# Patient Record
Sex: FEMALE | NOT HISPANIC OR LATINO | Employment: FULL TIME | URBAN - METROPOLITAN AREA
[De-identification: names, ages, dates, MRNs, and addresses within clinical notes are randomized per-mention and may not be internally consistent; named-entity substitution may affect disease eponyms.]

---

## 2024-08-30 ENCOUNTER — OFFICE VISIT (OUTPATIENT)
Dept: PRIMARY CARE | Facility: CLINIC | Age: 29
End: 2024-08-30
Payer: COMMERCIAL

## 2024-08-30 VITALS
BODY MASS INDEX: 31.49 KG/M2 | SYSTOLIC BLOOD PRESSURE: 128 MMHG | TEMPERATURE: 98.6 F | OXYGEN SATURATION: 98 % | WEIGHT: 193.6 LBS | HEART RATE: 70 BPM | DIASTOLIC BLOOD PRESSURE: 70 MMHG

## 2024-08-30 DIAGNOSIS — J01.10 ACUTE NON-RECURRENT FRONTAL SINUSITIS: Primary | ICD-10-CM

## 2024-08-30 PROCEDURE — 99214 OFFICE O/P EST MOD 30 MIN: CPT | Performed by: FAMILY MEDICINE

## 2024-08-30 PROCEDURE — 1036F TOBACCO NON-USER: CPT | Performed by: FAMILY MEDICINE

## 2024-08-30 RX ORDER — AMOXICILLIN 500 MG/1
500 CAPSULE ORAL 2 TIMES DAILY
Qty: 20 CAPSULE | Refills: 0 | Status: SHIPPED | OUTPATIENT
Start: 2024-08-30 | End: 2024-09-09

## 2024-08-30 ASSESSMENT — ENCOUNTER SYMPTOMS
COLOR CHANGE: 0
CHEST TIGHTNESS: 0
COUGH: 0
FATIGUE: 0
DIZZINESS: 0
APPETITE CHANGE: 0
BACK PAIN: 0
CHOKING: 0
ACTIVITY CHANGE: 0
FACIAL ASYMMETRY: 0
PALPITATIONS: 0
HEADACHES: 0
ARTHRALGIAS: 0
FEVER: 0
LIGHT-HEADEDNESS: 0

## 2024-08-30 NOTE — PROGRESS NOTES
Subjective   Patient ID: Jennifer Tidwell is a 29 y.o. female who presents for Possible sinus infection.  (Cough with chest congestion, nasal drainage, facial pressure. X yesterday. Neg for covid this morning. ).    HPI   Patient has been having cough, congestion facial pressure for one day. She is concerned because of a holiday weekend.   She did take a covid test this am and it was negative.     Review of Systems   Constitutional:  Negative for activity change, appetite change, fatigue and fever.   HENT:  Negative for congestion.    Respiratory:  Negative for cough, choking and chest tightness.    Cardiovascular:  Negative for chest pain, palpitations and leg swelling.   Musculoskeletal:  Negative for arthralgias, back pain and gait problem.   Skin:  Negative for color change and pallor.   Neurological:  Negative for dizziness, facial asymmetry, light-headedness and headaches.       Objective   /70 (BP Location: Right arm, Patient Position: Sitting)   Pulse 70   Temp 37 °C (98.6 °F)   Wt 87.8 kg (193 lb 9.6 oz)   SpO2 98%   BMI 31.49 kg/m²   BSA Body surface area is 2.02 meters squared.      Physical Exam  Constitutional:       General: She is not in acute distress.     Appearance: Normal appearance. She is not toxic-appearing.   HENT:      Head: Normocephalic.      Right Ear: Tympanic membrane, ear canal and external ear normal.      Left Ear: Tympanic membrane, ear canal and external ear normal.   Eyes:      Conjunctiva/sclera: Conjunctivae normal.      Pupils: Pupils are equal, round, and reactive to light.   Cardiovascular:      Rate and Rhythm: Normal rate and regular rhythm.      Pulses: Normal pulses.      Heart sounds: Normal heart sounds.   Pulmonary:      Effort: No respiratory distress.      Breath sounds: No wheezing, rhonchi or rales.   Abdominal:      General: Bowel sounds are normal. There is no distension.      Palpations: Abdomen is soft.      Tenderness: There is no abdominal tenderness.    Musculoskeletal:         General: No swelling or tenderness.   Skin:     Findings: No lesion or rash.   Neurological:      General: No focal deficit present.      Mental Status: She is alert and oriented to person, place, and time. Mental status is at baseline.      Gait: Gait normal.   Psychiatric:         Mood and Affect: Mood normal.         Behavior: Behavior normal.         Thought Content: Thought content normal.         Judgment: Judgment normal.       No visits with results within 1 Year(s) from this visit.   Latest known visit with results is:   [facility] Encounter on 10/30/2020   Component Date Value Ref Range Status    WBC 10/30/2020 6.4  4.4 - 11.3 x10E9/L Final    nRBC 10/30/2020 0.0  0.0 - 0.0 /100 WBC Final    RBC 10/30/2020 3.93 (L)  4.00 - 5.20 x10E12/L Final    Hemoglobin 10/30/2020 12.8  12.0 - 16.0 g/dL Final    Hematocrit 10/30/2020 39.7  36.0 - 46.0 % Final    MCV 10/30/2020 101 (H)  80 - 100 fL Final    MCHC 10/30/2020 32.2  32.0 - 36.0 g/dL Final    Platelets 10/30/2020 231  150 - 450 x10E9/L Final    RDW 10/30/2020 12.4  11.5 - 14.5 % Final    Neutrophils % 10/30/2020 73.1  40.0 - 80.0 % Final    Immature Granulocytes %, Automated 10/30/2020 0.3  0.0 - 0.9 % Final    Comment:  Immature Granulocyte Count (IG) includes promyelocytes,    myelocytes and metamyelocytes but does not include bands.   Percent differential counts (%) should be interpreted in the   context of the absolute cell counts (cells/L).      Lymphocytes % 10/30/2020 19.8  13.0 - 44.0 % Final    Monocytes % 10/30/2020 5.5  2.0 - 10.0 % Final    Eosinophils % 10/30/2020 0.8  0.0 - 6.0 % Final    Basophils % 10/30/2020 0.5  0.0 - 2.0 % Final    Neutrophils Absolute 10/30/2020 4.64  1.20 - 7.70 x10E9/L Final    Lymphocytes Absolute 10/30/2020 1.26  1.20 - 4.80 x10E9/L Final    Monocytes Absolute 10/30/2020 0.35  0.10 - 1.00 x10E9/L Final    Eosinophils Absolute 10/30/2020 0.05  0.00 - 0.70 x10E9/L Final    Basophils Absolute  10/30/2020 0.03  0.00 - 0.10 x10E9/L Final    TSH 10/30/2020 1.46  0.44 - 3.98 mIU/L Final    Comment:  TSH testing is performed using different testing    methodology at [facility] than at other    [facility]. Direct result comparisons should    only be made within the same method.      Cholesterol 10/30/2020 206 (H)  0 - 199 mg/dL Final    Comment: .      AGE      DESIRABLE   BORDERLINE HIGH   HIGH     0-19 Y     0 - 169       170 - 199     >/= 200    20-24 Y     0 - 189       190 - 224     >/= 225         >24 Y     0 - 199       200 - 239     >/= 240   **All ranges are based on fasting samples. Specific   therapeutic targets will vary based on patient-specific   cardiac risk.  .   Pediatric guidelines reference:Pediatrics 2011, 128(S5).   Adult guidelines reference: NCEP ATPIII Guidelines,     CHINMAY 2001, 258:7716-97  .   Venipuncture immediately after or during the    administration of Metamizole may lead to falsely   low results. Testing should be performed immediately   prior to Metamizole dosing.      HDL 10/30/2020 55.0  mg/dL Final    Comment: .      AGE      VERY LOW   LOW     NORMAL    HIGH       0-19 Y       < 35   < 40     40-45     ----    20-24 Y       ----   < 40       >45     ----      >24 Y       ----   < 40     40-60      >60  .      Cholesterol/HDL Ratio 10/30/2020 3.7   Final    Comment: REF VALUES  DESIRABLE  < 3.4  HIGH RISK  > 5.0      LDL 10/30/2020 137 (H)  0 - 119 mg/dL Final    Comment: .                           NEAR      BORD      AGE      DESIRABLE  OPTIMAL    HIGH     HIGH     VERY HIGH     0-19 Y     0 - 109     ---    110-129   >/= 130     ----    20-24 Y     0 - 119     ---    120-159   >/= 160     ----      >24 Y     0 -  99   100-129  130-159   160-189     >/=190  .      VLDL 10/30/2020 14  0 - 40 mg/dL Final    Triglycerides 10/30/2020 71  0 - 149 mg/dL Final    Comment: .      AGE      DESIRABLE   BORDERLINE HIGH   HIGH     VERY HIGH   0 D-90 D    19 - 174          ----         ----        ----  91 D- 9 Y     0 -  74        75 -  99     >/= 100      ----    10-19 Y     0 -  89        90 - 129     >/= 130      ----    20-24 Y     0 - 114       115 - 149     >/= 150      ----         >24 Y     0 - 149       150 - 199    200- 499    >/= 500  .   Venipuncture immediately after or during the    administration of Metamizole may lead to falsely   low results. Testing should be performed immediately   prior to Metamizole dosing.      Non HDL Cholesterol 10/30/2020 151 (H)  0 - 149 mg/dL Final    Comment:     AGE      DESIRABLE   BORDERLINE HIGH   HIGH     VERY HIGH     0-19 Y     0 - 119       120 - 144     >/= 145    >/= 160    20-24 Y     0 - 149       150 - 189     >/= 190      ----         >24 Y    30 MG/DL ABOVE LDL CHOLESTEROL GOAL  .      Vitamin D, 25-Hydroxy 10/30/2020 38  ng/mL Final    Comment: .  DEFICIENCY:         < 20   NG/ML  INSUFFICIENCY:      20-29  NG/ML  SUFFICIENCY:         NG/ML    THIS ASSAY ACCURATELY QUANTIFIES THE SUM OF  VITAMIN D3, 25-HYDROXY AND VIT D2,25-HYDROXY.      Glucose 10/30/2020 77  74 - 99 mg/dL Final    Sodium 10/30/2020 141  136 - 145 mmol/L Final    Potassium 10/30/2020 4.4  3.5 - 5.3 mmol/L Final    Chloride 10/30/2020 106  98 - 107 mmol/L Final    Bicarbonate 10/30/2020 26  21 - 32 mmol/L Final    Anion Gap 10/30/2020 13  10 - 20 mmol/L Final    Urea Nitrogen 10/30/2020 18  6 - 23 mg/dL Final    Creatinine 10/30/2020 1.07 (H)  0.50 - 1.05 mg/dL Final    GLOMERULAR FILTRATION RATE-NON AFR* 10/30/2020 >60  >60 mL/min/1.73m2 Final    GLOMERULAR FILTRATION RATE-* 10/30/2020 >60  >60 mL/min/1.73m2 Final    Comment:  CALCULATIONS OF ESTIMATED GFR ARE PERFORMED   USING THE MDRD STUDY EQUATION FOR THE   IDMS-TRACEABLE CREATININE METHODS.   CLIN CHEM 2007;53:766-72      Calcium 10/30/2020 9.7  8.6 - 10.6 mg/dL Final    Albumin 10/30/2020 4.6  3.4 - 5.0 g/dL Final    Alkaline Phosphatase 10/30/2020 34  33 - 110 U/L Final    Total  Protein 10/30/2020 7.1  6.4 - 8.2 g/dL Final    AST 10/30/2020 41 (H)  9 - 39 U/L Final    Total Bilirubin 10/30/2020 0.5  0.0 - 1.2 mg/dL Final    ALT (SGPT) 10/30/2020 21  7 - 45 U/L Final    Comment:  Patients treated with Sulfasalazine may generate    falsely decreased results for ALT.       Current Outpatient Medications on File Prior to Visit   Medication Sig Dispense Refill    prenatal vit 93/iron fum/folic (PRENATAL FORMULA ORAL) Take 1 tablet by mouth once daily.       No current facility-administered medications on file prior to visit.     No images are attached to the encounter.        Assessment/Plan   Diagnoses and all orders for this visit:  Acute non-recurrent frontal sinusitis  -     amoxicillin (Amoxil) 500 mg capsule; Take 1 capsule (500 mg) by mouth 2 times a day for 10 days.    Patient to start on antibiotics  Patient to call if questions or concerns

## 2024-11-12 ENCOUNTER — OFFICE VISIT (OUTPATIENT)
Dept: PRIMARY CARE | Facility: CLINIC | Age: 29
End: 2024-11-12
Payer: COMMERCIAL

## 2024-11-12 VITALS
HEART RATE: 69 BPM | TEMPERATURE: 98.2 F | WEIGHT: 208.6 LBS | DIASTOLIC BLOOD PRESSURE: 80 MMHG | BODY MASS INDEX: 33.93 KG/M2 | SYSTOLIC BLOOD PRESSURE: 122 MMHG | OXYGEN SATURATION: 98 %

## 2024-11-12 DIAGNOSIS — J01.10 ACUTE NON-RECURRENT FRONTAL SINUSITIS: Primary | ICD-10-CM

## 2024-11-12 PROCEDURE — 99214 OFFICE O/P EST MOD 30 MIN: CPT | Performed by: FAMILY MEDICINE

## 2024-11-12 PROCEDURE — 1036F TOBACCO NON-USER: CPT | Performed by: FAMILY MEDICINE

## 2024-11-12 RX ORDER — FOLIC ACID 0.4 MG
0.4 TABLET ORAL NIGHTLY
COMMUNITY

## 2024-11-12 RX ORDER — BUTALB/ACETAMINOPHEN/CAFFEINE 50-325-40
50 TABLET ORAL DAILY
COMMUNITY

## 2024-11-12 RX ORDER — AMOXICILLIN 875 MG/1
875 TABLET, FILM COATED ORAL 2 TIMES DAILY
Qty: 20 TABLET | Refills: 0 | Status: SHIPPED | OUTPATIENT
Start: 2024-11-12 | End: 2024-11-22

## 2024-11-12 ASSESSMENT — ENCOUNTER SYMPTOMS
FACIAL ASYMMETRY: 0
FEVER: 0
CHOKING: 0
ACTIVITY CHANGE: 0
CHEST TIGHTNESS: 0
COUGH: 0
FATIGUE: 0
ARTHRALGIAS: 0
DIZZINESS: 0
BACK PAIN: 0
COLOR CHANGE: 0
APPETITE CHANGE: 0
PALPITATIONS: 0
LIGHT-HEADEDNESS: 0
HEADACHES: 0

## 2024-11-12 NOTE — PROGRESS NOTES
Subjective   Patient ID: Jennifer Tidwell is a 29 y.o. female who presents for Cough (Facial pressure, nasal drainage, ears feel wet, achy. X 3 days. Neg for covid this am.).    HPI   Patient comes in stating that she has cough facial pressure nasal drainage ears feel wet and achy for 3 days.  Patient is negative for COVID this morning.    Review of Systems   Constitutional:  Negative for activity change, appetite change, fatigue and fever.   HENT:  Negative for congestion.    Respiratory:  Negative for cough, choking and chest tightness.    Cardiovascular:  Negative for chest pain, palpitations and leg swelling.   Musculoskeletal:  Negative for arthralgias, back pain and gait problem.   Skin:  Negative for color change and pallor.   Neurological:  Negative for dizziness, facial asymmetry, light-headedness and headaches.       Objective   /80 (BP Location: Right arm, Patient Position: Sitting)   Pulse 69   Temp 36.8 °C (98.2 °F)   Wt 94.6 kg (208 lb 9.6 oz)   SpO2 98%   BMI 33.93 kg/m²   BSA Body surface area is 2.09 meters squared.      Physical Exam  Constitutional:       General: She is not in acute distress.     Appearance: Normal appearance. She is not toxic-appearing.   HENT:      Head: Normocephalic.      Right Ear: Tympanic membrane, ear canal and external ear normal.      Left Ear: Tympanic membrane, ear canal and external ear normal.   Eyes:      Conjunctiva/sclera: Conjunctivae normal.      Pupils: Pupils are equal, round, and reactive to light.   Cardiovascular:      Rate and Rhythm: Normal rate and regular rhythm.      Pulses: Normal pulses.      Heart sounds: Normal heart sounds.   Pulmonary:      Effort: No respiratory distress.      Breath sounds: No wheezing, rhonchi or rales.   Abdominal:      General: Bowel sounds are normal. There is no distension.      Palpations: Abdomen is soft.      Tenderness: There is no abdominal tenderness.   Musculoskeletal:         General: No swelling or  tenderness.   Skin:     Findings: No lesion or rash.   Neurological:      General: No focal deficit present.      Mental Status: She is alert and oriented to person, place, and time. Mental status is at baseline.      Gait: Gait normal.   Psychiatric:         Mood and Affect: Mood normal.         Behavior: Behavior normal.         Thought Content: Thought content normal.         Judgment: Judgment normal.       No visits with results within 1 Year(s) from this visit.   Latest known visit with results is:   [facility]acy Encounter on 10/30/2020   Component Date Value Ref Range Status    WBC 10/30/2020 6.4  4.4 - 11.3 x10E9/L Final    nRBC 10/30/2020 0.0  0.0 - 0.0 /100 WBC Final    RBC 10/30/2020 3.93 (L)  4.00 - 5.20 x10E12/L Final    Hemoglobin 10/30/2020 12.8  12.0 - 16.0 g/dL Final    Hematocrit 10/30/2020 39.7  36.0 - 46.0 % Final    MCV 10/30/2020 101 (H)  80 - 100 fL Final    MCHC 10/30/2020 32.2  32.0 - 36.0 g/dL Final    Platelets 10/30/2020 231  150 - 450 x10E9/L Final    RDW 10/30/2020 12.4  11.5 - 14.5 % Final    Neutrophils % 10/30/2020 73.1  40.0 - 80.0 % Final    Immature Granulocytes %, Automated 10/30/2020 0.3  0.0 - 0.9 % Final    Comment:  Immature Granulocyte Count (IG) includes promyelocytes,    myelocytes and metamyelocytes but does not include bands.   Percent differential counts (%) should be interpreted in the   context of the absolute cell counts (cells/L).      Lymphocytes % 10/30/2020 19.8  13.0 - 44.0 % Final    Monocytes % 10/30/2020 5.5  2.0 - 10.0 % Final    Eosinophils % 10/30/2020 0.8  0.0 - 6.0 % Final    Basophils % 10/30/2020 0.5  0.0 - 2.0 % Final    Neutrophils Absolute 10/30/2020 4.64  1.20 - 7.70 x10E9/L Final    Lymphocytes Absolute 10/30/2020 1.26  1.20 - 4.80 x10E9/L Final    Monocytes Absolute 10/30/2020 0.35  0.10 - 1.00 x10E9/L Final    Eosinophils Absolute 10/30/2020 0.05  0.00 - 0.70 x10E9/L Final    Basophils Absolute 10/30/2020 0.03  0.00 - 0.10 x10E9/L Final    TSH  10/30/2020 1.46  0.44 - 3.98 mIU/L Final    Comment:  TSH testing is performed using different testing    methodology at [facility] than at other    [facility]. Direct result comparisons should    only be made within the same method.      Cholesterol 10/30/2020 206 (H)  0 - 199 mg/dL Final    Comment: .      AGE      DESIRABLE   BORDERLINE HIGH   HIGH     0-19 Y     0 - 169       170 - 199     >/= 200    20-24 Y     0 - 189       190 - 224     >/= 225         >24 Y     0 - 199       200 - 239     >/= 240   **All ranges are based on fasting samples. Specific   therapeutic targets will vary based on patient-specific   cardiac risk.  .   Pediatric guidelines reference:Pediatrics 2011, 128(S5).   Adult guidelines reference: NCEP ATPIII Guidelines,     CHINMAY 2001, 258:2486-97  .   Venipuncture immediately after or during the    administration of Metamizole may lead to falsely   low results. Testing should be performed immediately   prior to Metamizole dosing.      HDL 10/30/2020 55.0  mg/dL Final    Comment: .      AGE      VERY LOW   LOW     NORMAL    HIGH       0-19 Y       < 35   < 40     40-45     ----    20-24 Y       ----   < 40       >45     ----      >24 Y       ----   < 40     40-60      >60  .      Cholesterol/HDL Ratio 10/30/2020 3.7   Final    Comment: REF VALUES  DESIRABLE  < 3.4  HIGH RISK  > 5.0      LDL 10/30/2020 137 (H)  0 - 119 mg/dL Final    Comment: .                           NEAR      BORD      AGE      DESIRABLE  OPTIMAL    HIGH     HIGH     VERY HIGH     0-19 Y     0 - 109     ---    110-129   >/= 130     ----    20-24 Y     0 - 119     ---    120-159   >/= 160     ----      >24 Y     0 -  99   100-129  130-159   160-189     >/=190  .      VLDL 10/30/2020 14  0 - 40 mg/dL Final    Triglycerides 10/30/2020 71  0 - 149 mg/dL Final    Comment: .      AGE      DESIRABLE   BORDERLINE HIGH   HIGH     VERY HIGH   0 D-90 D    19 - 174         ----         ----        ----  91 D- 9 Y      0 -  74        75 -  99     >/= 100      ----    10-19 Y     0 -  89        90 - 129     >/= 130      ----    20-24 Y     0 - 114       115 - 149     >/= 150      ----         >24 Y     0 - 149       150 - 199    200- 499    >/= 500  .   Venipuncture immediately after or during the    administration of Metamizole may lead to falsely   low results. Testing should be performed immediately   prior to Metamizole dosing.      Non HDL Cholesterol 10/30/2020 151 (H)  0 - 149 mg/dL Final    Comment:     AGE      DESIRABLE   BORDERLINE HIGH   HIGH     VERY HIGH     0-19 Y     0 - 119       120 - 144     >/= 145    >/= 160    20-24 Y     0 - 149       150 - 189     >/= 190      ----         >24 Y    30 MG/DL ABOVE LDL CHOLESTEROL GOAL  .      Vitamin D, 25-Hydroxy 10/30/2020 38  ng/mL Final    Comment: .  DEFICIENCY:         < 20   NG/ML  INSUFFICIENCY:      20-29  NG/ML  SUFFICIENCY:         NG/ML    THIS ASSAY ACCURATELY QUANTIFIES THE SUM OF  VITAMIN D3, 25-HYDROXY AND VIT D2,25-HYDROXY.      Glucose 10/30/2020 77  74 - 99 mg/dL Final    Sodium 10/30/2020 141  136 - 145 mmol/L Final    Potassium 10/30/2020 4.4  3.5 - 5.3 mmol/L Final    Chloride 10/30/2020 106  98 - 107 mmol/L Final    Bicarbonate 10/30/2020 26  21 - 32 mmol/L Final    Anion Gap 10/30/2020 13  10 - 20 mmol/L Final    Urea Nitrogen 10/30/2020 18  6 - 23 mg/dL Final    Creatinine 10/30/2020 1.07 (H)  0.50 - 1.05 mg/dL Final    GLOMERULAR FILTRATION RATE-NON AFR* 10/30/2020 >60  >60 mL/min/1.73m2 Final    GLOMERULAR FILTRATION RATE-* 10/30/2020 >60  >60 mL/min/1.73m2 Final    Comment:  CALCULATIONS OF ESTIMATED GFR ARE PERFORMED   USING THE MDRD STUDY EQUATION FOR THE   IDMS-TRACEABLE CREATININE METHODS.   CLIN CHEM 2007;53:766-72      Calcium 10/30/2020 9.7  8.6 - 10.6 mg/dL Final    Albumin 10/30/2020 4.6  3.4 - 5.0 g/dL Final    Alkaline Phosphatase 10/30/2020 34  33 - 110 U/L Final    Total Protein 10/30/2020 7.1  6.4 - 8.2 g/dL Final    AST  10/30/2020 41 (H)  9 - 39 U/L Final    Total Bilirubin 10/30/2020 0.5  0.0 - 1.2 mg/dL Final    ALT (SGPT) 10/30/2020 21  7 - 45 U/L Final    Comment:  Patients treated with Sulfasalazine may generate    falsely decreased results for ALT.       Current Outpatient Medications on File Prior to Visit   Medication Sig Dispense Refill    calcium citrate-vitamin D3 (Citracal+D) 315 mg-5 mcg (200 unit) tablet Take 50 mcg by mouth once daily.      docosahexaenoic acid 200 mg capsule Take 200 mg by mouth once daily at bedtime.      folic acid (Folvite) 400 mcg tablet Take 1 tablet (0.4 mg) by mouth once daily at bedtime.      prenatal vit 93/iron fum/folic (PRENATAL FORMULA ORAL) Take 1 tablet by mouth once daily.       No current facility-administered medications on file prior to visit.     No images are attached to the encounter.            Assessment/Plan   Diagnoses and all orders for this visit:  Acute non-recurrent frontal sinusitis    Patient to start on antibiotics  Patient to call if questions or concerns

## 2025-01-07 ENCOUNTER — TELEMEDICINE (OUTPATIENT)
Dept: PRIMARY CARE | Facility: CLINIC | Age: 30
End: 2025-01-07
Payer: COMMERCIAL

## 2025-01-07 ENCOUNTER — APPOINTMENT (OUTPATIENT)
Dept: PRIMARY CARE | Facility: CLINIC | Age: 30
End: 2025-01-07
Payer: COMMERCIAL

## 2025-01-07 ENCOUNTER — LAB (OUTPATIENT)
Dept: LAB | Facility: LAB | Age: 30
End: 2025-01-07
Payer: COMMERCIAL

## 2025-01-07 DIAGNOSIS — R35.0 URINARY FREQUENCY: ICD-10-CM

## 2025-01-07 DIAGNOSIS — R35.0 URINARY FREQUENCY: Primary | ICD-10-CM

## 2025-01-07 LAB
APPEARANCE UR: ABNORMAL
BILIRUB UR STRIP.AUTO-MCNC: NEGATIVE MG/DL
COLOR UR: ABNORMAL
GLUCOSE UR STRIP.AUTO-MCNC: NORMAL MG/DL
HOLD SPECIMEN: NORMAL
KETONES UR STRIP.AUTO-MCNC: NEGATIVE MG/DL
LEUKOCYTE ESTERASE UR QL STRIP.AUTO: ABNORMAL
MUCOUS THREADS #/AREA URNS AUTO: ABNORMAL /LPF
NITRITE UR QL STRIP.AUTO: ABNORMAL
PH UR STRIP.AUTO: 6 [PH]
PROT UR STRIP.AUTO-MCNC: ABNORMAL MG/DL
RBC # UR STRIP.AUTO: ABNORMAL /UL
RBC #/AREA URNS AUTO: ABNORMAL /HPF
SP GR UR STRIP.AUTO: 1.03
SQUAMOUS #/AREA URNS AUTO: ABNORMAL /HPF
UROBILINOGEN UR STRIP.AUTO-MCNC: NORMAL MG/DL
WBC #/AREA URNS AUTO: >50 /HPF

## 2025-01-07 PROCEDURE — 87086 URINE CULTURE/COLONY COUNT: CPT

## 2025-01-07 PROCEDURE — 87186 SC STD MICRODIL/AGAR DIL: CPT

## 2025-01-07 PROCEDURE — 81001 URINALYSIS AUTO W/SCOPE: CPT

## 2025-01-07 PROCEDURE — 99214 OFFICE O/P EST MOD 30 MIN: CPT | Performed by: NURSE PRACTITIONER

## 2025-01-07 ASSESSMENT — ENCOUNTER SYMPTOMS
FREQUENCY: 1
HEMATURIA: 0
FLANK PAIN: 1

## 2025-01-07 NOTE — PROGRESS NOTES
Subjective   Patient ID: Jennifer Tidwell is a 29 y.o. female who presents for a Virtual Visit Urinary Problem.    Urinary Frequency   This is a new problem. The current episode started in the past 7 days. The problem occurs every urination (worse at night). The problem has been unchanged. The quality of the pain is described as burning. There has been no fever. She is Not sexually active. There is No history of pyelonephritis. Associated symptoms include flank pain, frequency and urgency. Pertinent negatives include no hematuria or hesitancy. Associated symptoms comments: Endorses left sided flank pain, cloudy urine, dysuria, frequency that is worse at night.  Recently had a baby girl, Dec 11, nursing. She has tried increased fluids for the symptoms. The treatment provided no relief.       Review of Systems   Genitourinary:  Positive for flank pain, frequency and urgency. Negative for hematuria and hesitancy.       Physical Exam not performed  E-visit questionnaire reviewed and discussed with patient.   All questions answered    Assessment/Plan   Problem List Items Addressed This Visit    None  Visit Diagnoses         Codes    Urinary frequency    -  Primary R35.0    Relevant Orders    Urinalysis with Reflex Culture and Microscopic          Discussed with patient   Verbalized understanding, Teach back utilized  Recommend follow up with PCP in   week

## 2025-01-08 DIAGNOSIS — N30.00 ACUTE CYSTITIS WITHOUT HEMATURIA: Primary | ICD-10-CM

## 2025-01-08 RX ORDER — NITROFURANTOIN 25; 75 MG/1; MG/1
100 CAPSULE ORAL 2 TIMES DAILY
Qty: 10 CAPSULE | Refills: 0 | Status: SHIPPED | OUTPATIENT
Start: 2025-01-08 | End: 2025-01-13

## 2025-01-10 LAB — BACTERIA UR CULT: ABNORMAL

## 2025-02-03 ENCOUNTER — APPOINTMENT (OUTPATIENT)
Dept: PRIMARY CARE | Facility: CLINIC | Age: 30
End: 2025-02-03
Payer: COMMERCIAL

## 2025-02-03 VITALS
WEIGHT: 190 LBS | BODY MASS INDEX: 30.9 KG/M2 | SYSTOLIC BLOOD PRESSURE: 128 MMHG | HEART RATE: 54 BPM | DIASTOLIC BLOOD PRESSURE: 82 MMHG

## 2025-02-03 DIAGNOSIS — F41.8 POSTPARTUM ANXIETY: Primary | ICD-10-CM

## 2025-02-03 DIAGNOSIS — J45.990 EXERCISE-INDUCED ASTHMA (HHS-HCC): ICD-10-CM

## 2025-02-03 PROCEDURE — 1036F TOBACCO NON-USER: CPT | Performed by: FAMILY MEDICINE

## 2025-02-03 PROCEDURE — 99214 OFFICE O/P EST MOD 30 MIN: CPT | Performed by: FAMILY MEDICINE

## 2025-02-03 RX ORDER — SERTRALINE HYDROCHLORIDE 25 MG/1
25 TABLET, FILM COATED ORAL DAILY
Qty: 30 TABLET | Refills: 11 | Status: SHIPPED | OUTPATIENT
Start: 2025-02-03 | End: 2026-02-03

## 2025-02-03 ASSESSMENT — ENCOUNTER SYMPTOMS
CONFUSION: 0
FACIAL ASYMMETRY: 0
ACTIVITY CHANGE: 0
COLOR CHANGE: 0
CHEST TIGHTNESS: 0
LIGHT-HEADEDNESS: 0
BACK PAIN: 0
PALPITATIONS: 0
NERVOUS/ANXIOUS: 1
CHOKING: 0
APPETITE CHANGE: 0
AGITATION: 0
DIZZINESS: 0
SLEEP DISTURBANCE: 0
HEADACHES: 0
FATIGUE: 0
COUGH: 0
ARTHRALGIAS: 0
FEVER: 0

## 2025-02-03 ASSESSMENT — ANXIETY QUESTIONNAIRES
7. FEELING AFRAID AS IF SOMETHING AWFUL MIGHT HAPPEN: SEVERAL DAYS
6. BECOMING EASILY ANNOYED OR IRRITABLE: SEVERAL DAYS
1. FEELING NERVOUS, ANXIOUS, OR ON EDGE: SEVERAL DAYS
IF YOU CHECKED OFF ANY PROBLEMS ON THIS QUESTIONNAIRE, HOW DIFFICULT HAVE THESE PROBLEMS MADE IT FOR YOU TO DO YOUR WORK, TAKE CARE OF THINGS AT HOME, OR GET ALONG WITH OTHER PEOPLE: NOT DIFFICULT AT ALL
4. TROUBLE RELAXING: SEVERAL DAYS
GAD7 TOTAL SCORE: 6
2. NOT BEING ABLE TO STOP OR CONTROL WORRYING: SEVERAL DAYS
5. BEING SO RESTLESS THAT IT IS HARD TO SIT STILL: NOT AT ALL
3. WORRYING TOO MUCH ABOUT DIFFERENT THINGS: SEVERAL DAYS

## 2025-02-03 NOTE — PROGRESS NOTES
Subjective   Patient ID: Jennifer Tidwell is a 29 y.o. female who presents for To discuss anxiety medication .    HPI   Patient comes in wishes to discuss her anxiety she is currently postpartum. She has had quite a bit of anxiety especially if she is going out in public. She has had some loneliness.     Review of Systems   Constitutional:  Negative for activity change, appetite change, fatigue and fever.   HENT:  Negative for congestion.    Respiratory:  Negative for cough, choking and chest tightness.    Cardiovascular:  Negative for chest pain, palpitations and leg swelling.   Musculoskeletal:  Negative for arthralgias, back pain and gait problem.   Skin:  Negative for color change and pallor.   Neurological:  Negative for dizziness, facial asymmetry, light-headedness and headaches.   Psychiatric/Behavioral:  Negative for agitation, confusion, self-injury, sleep disturbance and suicidal ideas. The patient is nervous/anxious.        Objective   /82 (BP Location: Left arm, Patient Position: Sitting)   Pulse 54   Wt 86.2 kg (190 lb)   BMI 30.90 kg/m²   BSA Body surface area is 2 meters squared.      Physical Exam  Constitutional:       General: She is not in acute distress.     Appearance: Normal appearance. She is not toxic-appearing.   HENT:      Head: Normocephalic.      Right Ear: Tympanic membrane, ear canal and external ear normal.      Left Ear: Tympanic membrane, ear canal and external ear normal.      Mouth/Throat:      Pharynx: Oropharynx is clear.   Eyes:      Conjunctiva/sclera: Conjunctivae normal.      Pupils: Pupils are equal, round, and reactive to light.   Cardiovascular:      Rate and Rhythm: Normal rate and regular rhythm.      Pulses: Normal pulses.      Heart sounds: Normal heart sounds.   Pulmonary:      Effort: No respiratory distress.      Breath sounds: No wheezing, rhonchi or rales.   Musculoskeletal:         General: No swelling or tenderness.   Skin:     Findings: No lesion or  rash.   Neurological:      General: No focal deficit present.      Mental Status: She is alert and oriented to person, place, and time. Mental status is at baseline.      Gait: Gait normal.   Psychiatric:         Mood and Affect: Mood normal.         Behavior: Behavior normal.         Thought Content: Thought content normal.         Judgment: Judgment normal.       Lab on 01/07/2025   Component Date Value Ref Range Status    Color, Urine 01/07/2025 Light-Yellow  Light-Yellow, Yellow, Dark-Yellow Final    Appearance, Urine 01/07/2025 Turbid (N)  Clear Final    Specific Gravity, Urine 01/07/2025 1.028  1.005 - 1.035 Final    pH, Urine 01/07/2025 6.0  5.0, 5.5, 6.0, 6.5, 7.0, 7.5, 8.0 Final    Protein, Urine 01/07/2025 30 (1+) (A)  NEGATIVE, 10 (TRACE), 20 (TRACE) mg/dL Final    Glucose, Urine 01/07/2025 Normal  Normal mg/dL Final    Blood, Urine 01/07/2025 0.2 (2+) (A)  NEGATIVE Final    Ketones, Urine 01/07/2025 NEGATIVE  NEGATIVE mg/dL Final    Bilirubin, Urine 01/07/2025 NEGATIVE  NEGATIVE Final    Urobilinogen, Urine 01/07/2025 Normal  Normal mg/dL Final    Nitrite, Urine 01/07/2025 2+ (A)  NEGATIVE Final    Leukocyte Esterase, Urine 01/07/2025 250 Zackary/uL (A)  NEGATIVE Final    Extra Tube 01/07/2025 Hold for add-ons.   Final    Auto resulted.    WBC, Urine 01/07/2025 >50 (A)  1-5, NONE /HPF Final    RBC, Urine 01/07/2025 11-20 (A)  NONE, 1-2, 3-5 /HPF Final    Squamous Epithelial Cells, Urine 01/07/2025 1-9 (SPARSE)  Reference range not established. /HPF Final    Mucus, Urine 01/07/2025 FEW  Reference range not established. /LPF Final    Urine Culture 01/07/2025 >=100,000 CFU/mL Escherichia coli (A)   Final     Current Outpatient Medications on File Prior to Visit   Medication Sig Dispense Refill    calcium citrate-vitamin D3 (Citracal+D) 315 mg-5 mcg (200 unit) tablet Take 50 mcg by mouth once daily.      [DISCONTINUED] docosahexaenoic acid 200 mg capsule Take 200 mg by mouth once daily at bedtime. (Patient not  taking: Reported on 2/3/2025)      [DISCONTINUED] folic acid (Folvite) 400 mcg tablet Take 1 tablet (0.4 mg) by mouth once daily at bedtime. (Patient not taking: Reported on 2/3/2025)      [DISCONTINUED] prenatal vit 93/iron fum/folic (PRENATAL FORMULA ORAL) Take 1 tablet by mouth once daily. (Patient not taking: Reported on 2/3/2025)       No current facility-administered medications on file prior to visit.     No images are attached to the encounter.            Assessment/Plan   Diagnoses and all orders for this visit:  Postpartum anxiety  -     sertraline (Zoloft) 25 mg tablet; Take 1 tablet (25 mg) by mouth once daily.  -     Follow Up In [facility]; Future  Exercise-induced asthma ([facility])    1.  Discussion with patient about anxiety and potentially talking to someone  2.  Patient would benefit from starting on Zoloft  3.  Patient to call for questions or concerns

## 2025-03-10 ENCOUNTER — APPOINTMENT (OUTPATIENT)
Dept: PRIMARY CARE | Facility: CLINIC | Age: 30
End: 2025-03-10
Payer: COMMERCIAL

## 2025-03-10 VITALS
BODY MASS INDEX: 30.86 KG/M2 | SYSTOLIC BLOOD PRESSURE: 126 MMHG | HEIGHT: 66 IN | HEART RATE: 65 BPM | OXYGEN SATURATION: 96 % | WEIGHT: 192 LBS | DIASTOLIC BLOOD PRESSURE: 78 MMHG | RESPIRATION RATE: 18 BRPM

## 2025-03-10 DIAGNOSIS — F41.8 POSTPARTUM ANXIETY: Primary | ICD-10-CM

## 2025-03-10 PROCEDURE — 99214 OFFICE O/P EST MOD 30 MIN: CPT | Performed by: FAMILY MEDICINE

## 2025-03-10 PROCEDURE — 1036F TOBACCO NON-USER: CPT | Performed by: FAMILY MEDICINE

## 2025-03-10 PROCEDURE — 3008F BODY MASS INDEX DOCD: CPT | Performed by: FAMILY MEDICINE

## 2025-03-10 ASSESSMENT — ENCOUNTER SYMPTOMS
NERVOUS/ANXIOUS: 1
SLEEP DISTURBANCE: 0
ACTIVITY CHANGE: 0
COUGH: 0
AGITATION: 0
CHOKING: 0
HEADACHES: 0
LIGHT-HEADEDNESS: 0
APPETITE CHANGE: 0
COLOR CHANGE: 0
PALPITATIONS: 0
FEVER: 0
BACK PAIN: 0
ARTHRALGIAS: 0
CHEST TIGHTNESS: 0
DIZZINESS: 0
FACIAL ASYMMETRY: 0
FATIGUE: 0
CONFUSION: 0

## 2025-03-10 ASSESSMENT — PATIENT HEALTH QUESTIONNAIRE - PHQ9
SUM OF ALL RESPONSES TO PHQ9 QUESTIONS 1 AND 2: 0
2. FEELING DOWN, DEPRESSED OR HOPELESS: NOT AT ALL
1. LITTLE INTEREST OR PLEASURE IN DOING THINGS: NOT AT ALL

## 2025-03-10 ASSESSMENT — COLUMBIA-SUICIDE SEVERITY RATING SCALE - C-SSRS
6. HAVE YOU EVER DONE ANYTHING, STARTED TO DO ANYTHING, OR PREPARED TO DO ANYTHING TO END YOUR LIFE?: NO
2. HAVE YOU ACTUALLY HAD ANY THOUGHTS OF KILLING YOURSELF?: NO
1. IN THE PAST MONTH, HAVE YOU WISHED YOU WERE DEAD OR WISHED YOU COULD GO TO SLEEP AND NOT WAKE UP?: NO

## 2025-03-10 ASSESSMENT — ANXIETY QUESTIONNAIRES
7. FEELING AFRAID AS IF SOMETHING AWFUL MIGHT HAPPEN: NOT AT ALL
5. BEING SO RESTLESS THAT IT IS HARD TO SIT STILL: NOT AT ALL
IF YOU CHECKED OFF ANY PROBLEMS ON THIS QUESTIONNAIRE, HOW DIFFICULT HAVE THESE PROBLEMS MADE IT FOR YOU TO DO YOUR WORK, TAKE CARE OF THINGS AT HOME, OR GET ALONG WITH OTHER PEOPLE: NOT DIFFICULT AT ALL
1. FEELING NERVOUS, ANXIOUS, OR ON EDGE: SEVERAL DAYS
4. TROUBLE RELAXING: NOT AT ALL
GAD7 TOTAL SCORE: 1
6. BECOMING EASILY ANNOYED OR IRRITABLE: NOT AT ALL
2. NOT BEING ABLE TO STOP OR CONTROL WORRYING: NOT AT ALL
3. WORRYING TOO MUCH ABOUT DIFFERENT THINGS: NOT AT ALL

## 2025-03-10 NOTE — PROGRESS NOTES
"Subjective   Patient ID: Jennifer Tidwell is a 29 y.o. female who presents for Follow-up (Started on zoloft last visit; patient states that she has had a positive response.).    HPI   Patient comes in wishes to discuss her anxiety she is currently postpartum. She has had quite a bit of anxiety especially if she is going out in public. She has had some loneliness.   She has been much better since starting on the zoloft. She does not feel any side effects of medication.     Review of Systems   Constitutional:  Negative for activity change, appetite change, fatigue and fever.   HENT:  Negative for congestion.    Respiratory:  Negative for cough, choking and chest tightness.    Cardiovascular:  Negative for chest pain, palpitations and leg swelling.   Musculoskeletal:  Negative for arthralgias, back pain and gait problem.   Skin:  Negative for color change and pallor.   Neurological:  Negative for dizziness, facial asymmetry, light-headedness and headaches.   Psychiatric/Behavioral:  Negative for agitation, confusion, self-injury, sleep disturbance and suicidal ideas. The patient is nervous/anxious.        Objective   /78 (BP Location: Left arm, Patient Position: Sitting, BP Cuff Size: Adult)   Pulse 65   Resp 18   Ht 1.67 m (5' 5.75\")   Wt 87.1 kg (192 lb)   SpO2 96%   BMI 31.23 kg/m²   BSA Body surface area is 2.01 meters squared.      Physical Exam  Constitutional:       General: She is not in acute distress.     Appearance: Normal appearance. She is not toxic-appearing.   HENT:      Head: Normocephalic.      Right Ear: Tympanic membrane, ear canal and external ear normal.      Left Ear: Tympanic membrane, ear canal and external ear normal.      Mouth/Throat:      Pharynx: Oropharynx is clear.   Eyes:      Conjunctiva/sclera: Conjunctivae normal.      Pupils: Pupils are equal, round, and reactive to light.   Cardiovascular:      Rate and Rhythm: Normal rate and regular rhythm.      Pulses: Normal pulses.    "   Heart sounds: Normal heart sounds.   Pulmonary:      Effort: No respiratory distress.      Breath sounds: No wheezing, rhonchi or rales.   Musculoskeletal:         General: No swelling or tenderness.   Skin:     Findings: No lesion or rash.   Neurological:      General: No focal deficit present.      Mental Status: She is alert and oriented to person, place, and time. Mental status is at baseline.      Gait: Gait normal.   Psychiatric:         Mood and Affect: Mood normal.         Behavior: Behavior normal.         Thought Content: Thought content normal.         Judgment: Judgment normal.       Lab on 01/07/2025   Component Date Value Ref Range Status    Color, Urine 01/07/2025 Light-Yellow  Light-Yellow, Yellow, Dark-Yellow Final    Appearance, Urine 01/07/2025 Turbid (N)  Clear Final    Specific Gravity, Urine 01/07/2025 1.028  1.005 - 1.035 Final    pH, Urine 01/07/2025 6.0  5.0, 5.5, 6.0, 6.5, 7.0, 7.5, 8.0 Final    Protein, Urine 01/07/2025 30 (1+) (A)  NEGATIVE, 10 (TRACE), 20 (TRACE) mg/dL Final    Glucose, Urine 01/07/2025 Normal  Normal mg/dL Final    Blood, Urine 01/07/2025 0.2 (2+) (A)  NEGATIVE Final    Ketones, Urine 01/07/2025 NEGATIVE  NEGATIVE mg/dL Final    Bilirubin, Urine 01/07/2025 NEGATIVE  NEGATIVE Final    Urobilinogen, Urine 01/07/2025 Normal  Normal mg/dL Final    Nitrite, Urine 01/07/2025 2+ (A)  NEGATIVE Final    Leukocyte Esterase, Urine 01/07/2025 250 Zackary/uL (A)  NEGATIVE Final    Extra Tube 01/07/2025 Hold for add-ons.   Final    Auto resulted.    WBC, Urine 01/07/2025 >50 (A)  1-5, NONE /HPF Final    RBC, Urine 01/07/2025 11-20 (A)  NONE, 1-2, 3-5 /HPF Final    Squamous Epithelial Cells, Urine 01/07/2025 1-9 (SPARSE)  Reference range not established. /HPF Final    Mucus, Urine 01/07/2025 FEW  Reference range not established. /LPF Final    Urine Culture 01/07/2025 >=100,000 CFU/mL Escherichia coli (A)   Final     Current Outpatient Medications on File Prior to Visit   Medication Sig  Dispense Refill    calcium citrate-vitamin D3 (Citracal+D) 315 mg-5 mcg (200 unit) tablet Take 50 mcg by mouth once daily.      sertraline (Zoloft) 25 mg tablet Take 1 tablet (25 mg) by mouth once daily. 30 tablet 11     No current facility-administered medications on file prior to visit.     No images are attached to the encounter.              Assessment/Plan   Diagnoses and all orders for this visit:  Postpartum anxiety      1.  Discussion with patient about anxiety and potentially talking to someone  2.  Patient would benefit from starting on Zoloft  3.  Patient to call for questions or concerns

## 2025-05-02 ENCOUNTER — APPOINTMENT (OUTPATIENT)
Dept: PRIMARY CARE | Facility: CLINIC | Age: 30
End: 2025-05-02
Payer: COMMERCIAL